# Patient Record
Sex: MALE | Race: WHITE | Employment: FULL TIME | URBAN - METROPOLITAN AREA
[De-identification: names, ages, dates, MRNs, and addresses within clinical notes are randomized per-mention and may not be internally consistent; named-entity substitution may affect disease eponyms.]

---

## 2020-03-03 ENCOUNTER — APPOINTMENT (OUTPATIENT)
Dept: CT IMAGING | Age: 55
End: 2020-03-03
Payer: COMMERCIAL

## 2020-03-03 ENCOUNTER — HOSPITAL ENCOUNTER (EMERGENCY)
Age: 55
Discharge: HOME | End: 2020-03-03
Attending: EMERGENCY MEDICINE
Payer: COMMERCIAL

## 2020-03-03 VITALS
HEART RATE: 66 BPM | TEMPERATURE: 98.9 F | DIASTOLIC BLOOD PRESSURE: 78 MMHG | WEIGHT: 220 LBS | BODY MASS INDEX: 32.58 KG/M2 | SYSTOLIC BLOOD PRESSURE: 128 MMHG | OXYGEN SATURATION: 94 % | RESPIRATION RATE: 16 BRPM | HEIGHT: 69 IN

## 2020-03-03 PROCEDURE — 74176 CT ABD & PELVIS W/O CONTRAST: CPT

## 2020-03-03 PROCEDURE — 99283 EMERGENCY DEPT VISIT LOW MDM: CPT

## 2020-03-03 ASSESSMENT — PAIN DESCRIPTION - ORIENTATION: ORIENTATION: LEFT

## 2020-03-03 ASSESSMENT — PAIN SCALES - GENERAL: PAINLEVEL_OUTOF10: 1

## 2020-03-03 ASSESSMENT — PAIN DESCRIPTION - PAIN TYPE: TYPE: ACUTE PAIN

## 2020-03-03 ASSESSMENT — PAIN DESCRIPTION - DESCRIPTORS: DESCRIPTORS: DISCOMFORT

## 2020-03-03 ASSESSMENT — PAIN DESCRIPTION - LOCATION: LOCATION: GROIN

## 2020-03-03 NOTE — ED PROVIDER NOTES
HPI:  3/3/20, Time: 8:42 AM         Dawn Figueroa is a 49 y.o. male presenting to the ED for left gring/inguinal pian with bulging, beginning 2-3 weeks ago. The complaint has been intermittent, moderate in severity, and worsened by heaving lifting. 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 Denies definitive sudden onset/popping sensation. Denies scrotal swelling or  complaints. Denies direct trauma. Patient denies fever/chills, cough, congestion, chest pain, shortness of breath, edema, headache, visual disturbances, focal paresthesias, focal weakness, abdominal pain, nausea, vomiting, diarrhea, constipation, dysuria, hematuria, trauma, neck or back pain or other complaints. ROS:   Pertinent positives and negatives are stated within HPI, all other systems reviewed and are negative.      --------------------------------------------- PAST HISTORY ---------------------------------------------  Past Medical History:  has a past medical history of Anxiety and OCD (obsessive compulsive disorder). Past Surgical History:  has a past surgical history that includes shoulder surgery. Social History:  reports that he has never smoked. He has never used smokeless tobacco. He reports that he does not drink alcohol or use drugs. Family History: family history is not on file. The patients home medications have been reviewed. Allergies: Patient has no known allergies.         ---------------------------------------------------PHYSICAL EXAM--------------------------------------    Constitutional:  Well developed, well nourished, no acute distress, non-toxic appearance   Eyes:  PERRL, conjunctiva normal, EOMI  HENT:  Atraumatic, external ears normal, nose normal, oropharynx moist. Neck- normal range of motion,   Respiratory:  No respiratory distress, normal breath sounds, no rales, no wheezing   Cardiovascular:  Normal rate, normal rhythm, no surgeons. Advised to contact his HR department today for follow-up. Patient was explicitly instructed on specific signs and symptoms on which to return to the emergency room for. Patient was instructed to return to the ER for any new or worsening symptoms. Additional discharge instructions were given verbally. All questions were answered. Patient is comfortable and agreeable with discharge plan. Patient in no acute distress and non-toxic in appearance. This patient's ED course included: a personal history and physicial eaxmination    This patient has remained hemodynamically stable during their ED course. Counseling: The emergency provider has spoken with the patient and discussed todays results, in addition to providing specific details for the plan of care and counseling regarding the diagnosis and prognosis. Questions are answered at this time and they are agreeable with the plan.       --------------------------------- IMPRESSION AND DISPOSITION ---------------------------------    IMPRESSION  1.  Left inguinal hernia        DISPOSITION  Disposition: Discharge to home  Patient condition is stable                  Perfecto Hopper DO  03/03/20 6985

## 2020-03-03 NOTE — ED NOTES
Discharge instructions given, patient verbalized their understanding, no other noted or stated problems at this time. Patient will follow up with primary doctor for care.      Nba Fisher RN  03/03/20 9812

## 2022-07-25 ENCOUNTER — HOSPITAL ENCOUNTER (EMERGENCY)
Age: 57
Discharge: HOME | End: 2022-07-25
Payer: COMMERCIAL

## 2022-07-25 ENCOUNTER — APPOINTMENT (OUTPATIENT)
Dept: GENERAL RADIOLOGY | Age: 57
End: 2022-07-25
Payer: COMMERCIAL

## 2022-07-25 VITALS
WEIGHT: 220 LBS | HEART RATE: 82 BPM | DIASTOLIC BLOOD PRESSURE: 88 MMHG | RESPIRATION RATE: 16 BRPM | SYSTOLIC BLOOD PRESSURE: 144 MMHG | HEIGHT: 69 IN | TEMPERATURE: 98.3 F | OXYGEN SATURATION: 98 % | BODY MASS INDEX: 32.58 KG/M2

## 2022-07-25 DIAGNOSIS — S60.211A CONTUSION OF RIGHT WRIST, INITIAL ENCOUNTER: ICD-10-CM

## 2022-07-25 DIAGNOSIS — S63.501A SPRAIN OF RIGHT WRIST, INITIAL ENCOUNTER: Primary | ICD-10-CM

## 2022-07-25 PROCEDURE — 99283 EMERGENCY DEPT VISIT LOW MDM: CPT

## 2022-07-25 PROCEDURE — 73110 X-RAY EXAM OF WRIST: CPT

## 2022-07-25 ASSESSMENT — PAIN - FUNCTIONAL ASSESSMENT: PAIN_FUNCTIONAL_ASSESSMENT: 0-10

## 2022-07-25 ASSESSMENT — PAIN SCALES - GENERAL: PAINLEVEL_OUTOF10: 10

## 2022-07-25 NOTE — ED PROVIDER NOTES
[facility]    Department of Emergency Medicine   ED  Provider Note  Admit Date/RoomTime: 7/25/2022  6:56 PM  ED Room: [place] [address]          6:52 PM EDT      HPI: Agustin Alvarez 55 y.o. male with a past medical history of   Past Medical History:   Diagnosis Date    Anxiety     OCD (obsessive compulsive disorder)      presents status post right wrist injury. The patient states that the injury occurred today. The history is obtained from the patient. Patient states that he caught his wrist between two objects while at work and this is a workers comp injury. Patient describes the pain as tender/sore. Patient states the pain is worsened by palpation or movement. Patient has limited range of motion and pain with flexion and extension of wrist joint. Patient denies any distal neurologic symptoms including numbness, tingling, paralysis or coolness of the extremity. No other reported injuries or other extremity tenderness or injuries. Patient denies any history of injury or surgery to this extremity. Patient has tried tylenol for symptom relief, which has helped minimally. Patient denies any head injury or back injury. He has no other reported concerns today. Review of Systems:   Pertinent positives and negatives are stated within HPI, all other systems reviewed and are negative. Prachi Golden --------------------------------------------- PAST HISTORY ---------------------------------------------  Past Medical History:  has a past medical history of Anxiety and OCD (obsessive compulsive disorder). Past Surgical History:  has a past surgical history that includes shoulder surgery. Social History:  reports that he has never smoked. He has never used smokeless tobacco. He reports that he does not drink alcohol and does not use drugs. Family History: family history is not on file. The patients home medications have been reviewed. Allergies: Patient has no known allergies.     Physical reviewed all laboratory and imaging results for this patient. Results are listed below. LABS:  No results found for this visit on 07/25/22. RADIOLOGY:  Interpreted by Radiologist.  XR WRIST RIGHT (MIN 3 VIEWS)   Final Result   1. No acute osseous findings seen about the right wrist.  Soft tissue   swelling about the right wrist likely related to recent injury. 2.  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 RECOMMENDATION:   In the setting of trauma, if there is persistent symptoms and physical exam   warrants a repeat radiograph in 10-14 days could be considered as occult   fractures may not be evident on initial imaging evaluation.                 ------------------------------ ED COURSE/MEDICAL DECISION MAKING----------------------  Medications - No data to display        Medical decision making: Patient presents to the emergency department today for evaluation of right wrist following an injury that occurred prior to arrival.  Patient had imaging completed today in emergency department which is negative for any acute findings. Patient given a brace which was applied to the extremity while in the emergency department. Patient was instructed to restrict activity, with goal to reduce swelling and pain. Patient educated to rest the extremity and utilize elevation and compression which will help reduce soft tissue swelling. Patient educated to apply ice for approximately 20-minute periods every 60 to 90 minutes for initial 24 to 48 hours following this injury. After swelling and pain are reduced, patient may begin rehabilitation exercises to prevent stiffness, improve ROM, and restore tissue/joint flexibility and strength. Patient can proceed with activity as tolerated. Patient can utilize Tylenol or anti-inflammatory medication for pain relief if needed. Patient will follow up with occupational health on an as needed basis.   Patient is to return to emergency department if any new symptoms such as swelling, numbness, or discoloration develops.              Impression:   Right wrist sprain  Right wrist contusion    Disposition:  Discharge    Condition:  BRYAN Anderson - TOM  07/25/22 0155

## 2024-06-19 ENCOUNTER — HOSPITAL ENCOUNTER (EMERGENCY)
Age: 59
Discharge: HOME | End: 2024-06-19
Payer: COMMERCIAL

## 2024-06-19 ENCOUNTER — APPOINTMENT (OUTPATIENT)
Dept: GENERAL RADIOLOGY | Age: 59
End: 2024-06-19
Payer: COMMERCIAL

## 2024-06-19 VITALS
HEART RATE: 71 BPM | TEMPERATURE: 98.8 F | BODY MASS INDEX: 32.49 KG/M2 | RESPIRATION RATE: 18 BRPM | OXYGEN SATURATION: 98 % | SYSTOLIC BLOOD PRESSURE: 145 MMHG | DIASTOLIC BLOOD PRESSURE: 99 MMHG | WEIGHT: 220 LBS

## 2024-06-19 DIAGNOSIS — S83.92XA SPRAIN OF LEFT KNEE, UNSPECIFIED LIGAMENT, INITIAL ENCOUNTER: Primary | ICD-10-CM

## 2024-06-19 PROCEDURE — 6370000000 HC RX 637 (ALT 250 FOR IP): Performed by: NURSE PRACTITIONER

## 2024-06-19 PROCEDURE — 99212 OFFICE O/P EST SF 10 MIN: CPT

## 2024-06-19 PROCEDURE — 73562 X-RAY EXAM OF KNEE 3: CPT

## 2024-06-19 RX ORDER — NAPROXEN 500 MG/1
500 TABLET ORAL 2 TIMES DAILY
Qty: 14 TABLET | Refills: 0 | Status: SHIPPED | OUTPATIENT
Start: 2024-06-19 | End: 2024-06-26

## 2024-06-19 RX ORDER — NAPROXEN 250 MG/1
500 TABLET ORAL ONCE
Status: COMPLETED | OUTPATIENT
Start: 2024-06-19 | End: 2024-06-19

## 2024-06-19 RX ADMIN — NAPROXEN 500 MG: 250 TABLET ORAL at 11:41

## 2024-06-19 ASSESSMENT — PAIN - FUNCTIONAL ASSESSMENT: PAIN_FUNCTIONAL_ASSESSMENT: 0-10

## 2024-06-19 ASSESSMENT — PAIN DESCRIPTION - LOCATION: LOCATION: KNEE

## 2024-06-19 ASSESSMENT — PAIN DESCRIPTION - FREQUENCY: FREQUENCY: INTERMITTENT

## 2024-06-19 ASSESSMENT — PAIN SCALES - GENERAL
PAINLEVEL_OUTOF10: 1
PAINLEVEL_OUTOF10: 2

## 2024-06-19 ASSESSMENT — PAIN DESCRIPTION - DESCRIPTORS: DESCRIPTORS: ACHING;SORE;DISCOMFORT

## 2024-06-19 ASSESSMENT — PAIN DESCRIPTION - PAIN TYPE: TYPE: ACUTE PAIN

## 2024-06-19 ASSESSMENT — PAIN DESCRIPTION - ORIENTATION: ORIENTATION: LEFT

## 2024-06-19 NOTE — ED PROVIDER NOTES
observed/palpated.              ROM: full range with pain.             Skin:  no wounds or erythema.       Drawer's:  Positive.       Lachman's: Negative.       Apley's: Negative.       Freddy's: Negative.       Valgus/Varus Stress: Negative.       Crepitus: Negative.       Joint(s) Above/Below: ankle.                Tenderness:  none            Swelling: No.              Deformity: no deformity observed/palpated.             ROM: full range of motion.            Skin:  no wounds, erythema, or swelling.       Neurovascular:             Motor deficit: none.              Sensory deficit: none.             Pulse deficit: none.             Capillary refill: normal.  Gait:  normal.  Lymphatics: No lymphangitis or adenopathy noted.  Neurological:  Oriented.  Motor functions intact.    Lab / Imaging Results   (All laboratory and radiology results have been personally reviewed by myself)  Labs:  No results found for this visit on 06/19/24.  Imaging:  All Radiology results interpreted by Radiologist unless otherwise noted.  XR KNEE LEFT (3 VIEWS)   Final Result   No acute abnormality of the knee.      Mild joint effusion.      Mild osteoarthritis.           ED Course / Medical Decision Making     Medications   naproxen (NAPROSYN) tablet 500 mg (500 mg Oral Given 6/19/24 1141)        Consult(s):   None    Procedure(s):   none.    MDM: Patient is well-appearing, afebrile presents to  today with left knee pain after stepping backwards while pulling an object at work.  X-rays were obtained negative for any acute bony abnormalities.  Advised on x-ray findings symptom control with rest, ice, compression and NSAIDs.  Patient given naproxen while here, applied Ace wrap for compression, advised to follow-up outpatient with corporate care or orthopedics if symptoms persist and was educated on signs and symptoms which require emergent re-evaluation, verbalized understanding and agreeable with plan of care.  Patient requesting to

## 2024-06-19 NOTE — ED NOTES
UDS and Saliva Alcohol specimens collected per protocol.     Kayla Chinchilla, WHITNEY  06/19/24 1156    
Present, unchanged

## 2024-07-02 ENCOUNTER — TELEPHONE (OUTPATIENT)
Dept: PHYSICAL THERAPY | Age: 59
End: 2024-07-02

## 2024-07-10 ENCOUNTER — EVALUATION (OUTPATIENT)
Dept: PHYSICAL THERAPY | Age: 59
End: 2024-07-10

## 2024-07-10 DIAGNOSIS — S83.92XA SPRAIN OF LEFT KNEE, UNSPECIFIED LIGAMENT, INITIAL ENCOUNTER: Primary | ICD-10-CM

## 2024-07-10 NOTE — PROGRESS NOTES
[facility] OUTPATIENT REHABILITATION  PHYSICAL THERAPY INITIAL EVALUATION         Date:  7/10/2024   Patient: Richard Traore  : 1965  MRN: [id number]  Referring Provider: Wilfrido Zurita PA-C [address] [place] [address]     Medical Diagnosis:      Diagnosis Orders   1. Sprain of left knee, unspecified ligament, initial encounter            Physician Order: Eval and Treat   Claim # [id number]  Dx: S83.92XA   approved 12 visits through 2024      SUBJECTIVE:     Onset date: 2024    Onset: Sudden     History / Mechanism of Injury: Pulling steel from press.  Felt sharp medial knee pain.      Interventions for current problem:  naproxen     Chief complaint:  pain with loading over time      Behavior: condition is getting better    Pain:  Current: 0/10     Best: 0/10     Worst:7/10     Symptom Type / Quality: sharp  Location:: Knee:  colleen-medial    Aggravated by:  prolonged standing, pivoting    Relieved by: reduced weightbearing    Imaging results: XR KNEE LEFT (3 VIEWS)    Result Date: 2024  EXAMINATION: THREE XRAY VIEWS OF THE LEFT KNEE 2024 10:39 am COMPARISON: None. HISTORY: ORDERING SYSTEM PROVIDED HISTORY: fall TECHNOLOGIST PROVIDED HISTORY: Reason for exam:->fall FINDINGS: No evidence of acute fracture or dislocation.  No focal osseous lesion. There is mild joint effusion. No focal soft tissue abnormality.     No acute abnormality of the knee. Mild joint effusion. Mild osteoarthritis.       Past Medical History  Past Medical History:   Diagnosis Date    Anxiety     OCD (obsessive compulsive disorder)      Past Surgical History:   Procedure Laterality Date    SHOULDER SURGERY      open reduction fx repair       Medications:   Current Outpatient Medications   Medication Sig Dispense Refill    naproxen (NAPROSYN) 500 MG tablet Take 1 tablet by mouth 2 times daily for 7 days 14 tablet 0    citalopram (CELEXA) 20 MG tablet Take 40 mg by mouth daily.

## 2024-07-10 NOTE — PROGRESS NOTES
Physical Therapy Daily Treatment Note    Date: 7/10/2024  Patient Name: Richard Traore  : 1965   MRN: [id number]  DOInjury: 2024   DOSx: --  Referring Provider: Wilfrido Zurita PA-C  [address] [place] [address]     Medical Diagnosis:      Diagnosis Orders   1. Sprain of left knee, unspecified ligament, initial encounter          Jos has superior colleen-medial knee pain with good motion and strength with no edema. Treatment will focus on building loading tolerance over time to improve ability to work without pain.     Access Code: [email or web address]  URL: [email or web address]  Date: 07/10/2024  Prepared by: Kendall Schultz    Exercises  - Supine Heel Slide (Mirrored)  - 2 x daily - 3 sets - 10 reps  - Supine Quad Set  - 2 x daily - 3 sets - 10-15 reps - 5 sec hold  - Supine Straight Leg Raises  - 2 x daily - 3 sets - 10 reps  - Seated Knee Extension AROM (Mirrored)  - 2 x daily - 3 sets - 10 reps    X = TO BE PERFORMED NEXT VISIT  > = PROGRESS TO THIS    S: See diazal  O:   Time 1700-5436     Visit     Claim # [id number]  Dx: S83.92XA   approved 12 visits through 2024  Repeat outcome measure at mid point and end.    Pain Pain 0-7/10     ROM Good     Modalities         MO   Exercise      NuStep       Heel slides 3 x 10  TE   QS 3 x 10  TE   SLR 3 x 10  TE   LAQ 3 x 10  TE   Leg Press 2-leg X  TE   Leg Press 1-leg   TE   Hamstring Curl Machine X  TE   Knee Extension Machine X  TE   Step-ups - FWD   TA   Step-ups - LAT   TA   Step-ups - BWD   TA   Calf Raises   TA      TA      TA               A:  Tolerated well.  Discussed anatomy, physiology, body mechanics, principles of loading, and progressive loading/activity.  Reviewed home exercise program extensively; written copy provided.    P: Continue with rehab plan  Kendall Schultz PT    Treatment Charges: Mins Units   Initial Evaluation 20 1   Re-Evaluation     Ther Exercise         TE 25 2   Manual Therapy     MT     Ther Activities        TA

## 2024-07-15 ENCOUNTER — TREATMENT (OUTPATIENT)
Dept: PHYSICAL THERAPY | Age: 59
End: 2024-07-15
Payer: COMMERCIAL

## 2024-07-15 DIAGNOSIS — S83.92XA SPRAIN OF LEFT KNEE, UNSPECIFIED LIGAMENT, INITIAL ENCOUNTER: Primary | ICD-10-CM

## 2024-07-15 PROCEDURE — 97110 THERAPEUTIC EXERCISES: CPT

## 2024-07-15 NOTE — PROGRESS NOTES
Physical Therapy Daily Treatment Note    Date: 7/15/2024  Patient Name: Richard Traore  : 1965   MRN: [id number]  DOInjury: 2024   DOSx: --  Referring Provider: Wilfrido Zurita PA-C  [address] [place], [place] [id number]     Medical Diagnosis:      Diagnosis Orders   1. Sprain of left knee, unspecified ligament, initial encounter          Jos has superior colleen-medial knee pain with good motion and strength with no edema. Treatment will focus on building loading tolerance over time to improve ability to work without pain.     Access Code: [email or web address]  URL: [email or web address]  Date: 07/10/2024  Prepared by: Kendall Schultz    Exercises  - Supine Heel Slide (Mirrored)  - 2 x daily - 3 sets - 10 reps  - Supine Quad Set  - 2 x daily - 3 sets - 10-15 reps - 5 sec hold  - Supine Straight Leg Raises  - 2 x daily - 3 sets - 10 reps  - Seated Knee Extension AROM (Mirrored)  - 2 x daily - 3 sets - 10 reps    X = TO BE PERFORMED NEXT VISIT  > = PROGRESS TO THIS    S: pt reports no new changes since last visit .  O:   Time 1701-0512     Visit     Claim # [id number]  Dx: S83.92XA   approved 12 visits through 2024  Repeat outcome measure at mid point and end.    Pain Pain 0-7/10     ROM Good     Modalities         MO   Exercise      NuStep  L 5 x 5 min      Heel slides HEP  TE   QS  TE   SLR  TE   LAQ  TE   Leg Press 2-leg 25# 3 X 15 new Pt given handouts of ex's 7/15/2024  TE   Leg Press 1-leg   TE   Hamstring Curl Machine 35# 3 X 15 new   TE   Knee Extension Machine 4# cuff wt  3 X 15   TE   Step-ups - FWD   TA   Step-ups - LAT   TA   Step-ups - BWD   TA   Calf Raises   TA      TA      TA               A:  Tolerated well. Pt will perform previously given ex's home later tonight , new introduced wt machine ex's performed this session. Discussed anatomy, physiology, body mechanics, principles of loading, and progressive loading/activity.  Reviewed home exercise program extensively; written copy

## 2024-07-16 ENCOUNTER — HOSPITAL ENCOUNTER (OUTPATIENT)
Dept: MRI IMAGING | Age: 59
Discharge: HOME | End: 2024-07-18
Payer: COMMERCIAL

## 2024-07-16 DIAGNOSIS — S83.92XA SPRAIN OF LEFT KNEE, INITIAL ENCOUNTER: ICD-10-CM

## 2024-07-16 PROCEDURE — 73721 MRI JNT OF LWR EXTRE W/O DYE: CPT

## 2024-07-17 ENCOUNTER — TREATMENT (OUTPATIENT)
Dept: PHYSICAL THERAPY | Age: 59
End: 2024-07-17
Payer: COMMERCIAL

## 2024-07-17 DIAGNOSIS — S83.92XA SPRAIN OF LEFT KNEE, UNSPECIFIED LIGAMENT, INITIAL ENCOUNTER: Primary | ICD-10-CM

## 2024-07-17 PROCEDURE — 97110 THERAPEUTIC EXERCISES: CPT | Performed by: PHYSICAL THERAPIST

## 2024-07-17 NOTE — PROGRESS NOTES
Physical Therapy Daily Treatment Note    Date: 2024  Patient Name: Richard Traore  : 1965   MRN: [id number]  DOInjury: 2024   DOSx: --  Referring Provider: Wilfrido Zurita PA-C  [address] [place] [address]     Medical Diagnosis:      Diagnosis Orders   1. Sprain of left knee, unspecified ligament, initial encounter          Jos has superior colleen-medial knee pain with good motion and strength with no edema. Treatment will focus on building loading tolerance over time to improve ability to work without pain.     Access Code: [email or web address]  URL: [email or web address]  Date: 07/10/2024  Prepared by: Kendall Schultz    Exercises  - Supine Heel Slide (Mirrored)  - 2 x daily - 3 sets - 10 reps  - Supine Quad Set  - 2 x daily - 3 sets - 10-15 reps - 5 sec hold  - Supine Straight Leg Raises  - 2 x daily - 3 sets - 10 reps  - Seated Knee Extension AROM (Mirrored)  - 2 x daily - 3 sets - 10 reps    X = TO BE PERFORMED NEXT VISIT  > = PROGRESS TO THIS    S: Feeling better.  Had MRI today.  PCP referring Jos to Dr. Zimmerman for consult.   O:   Time 5648-9187     Visit 3/12    Claim # [id number]  Dx: S83.92XA   approved 12 visits through 2024  Repeat outcome measure at mid point and end.    Pain Pain 0-7/10     ROM Good     Modalities         MO   Exercise      NuStep  L 5 x 10 min      Heel slides HEP  TE   QS  TE   SLR  TE   LAQ  TE   Leg Press 2-leg 60 lbs 3 x 12 Pt given handouts of ex's 7/15/2024  TE   Leg Press 1-leg >  TE   Hamstring Curl Machine 35 lbs 3 X 12  TE   Knee Extension Machine 20 lbs 3 x 12   TE   Step-ups - FWD >  TA   Step-ups - LAT >  TA   Step-ups - BWD >  TA   Calf Raises >  TA      TA      TA               A:  Tolerated well.   P: Continue with rehab plan  Kendall Schultz, PT    Treatment Charges: Mins Units   Initial Evaluation     Re-Evaluation     Ther Exercise         TE 45 3   Manual Therapy     MT     Ther Activities        TA     Gait Training          GT     Neuro

## 2024-07-29 ENCOUNTER — TREATMENT (OUTPATIENT)
Dept: PHYSICAL THERAPY | Age: 59
End: 2024-07-29
Payer: COMMERCIAL

## 2024-07-29 DIAGNOSIS — S83.92XA SPRAIN OF LEFT KNEE, UNSPECIFIED LIGAMENT, INITIAL ENCOUNTER: Primary | ICD-10-CM

## 2024-07-29 PROCEDURE — 97110 THERAPEUTIC EXERCISES: CPT

## 2024-07-29 NOTE — PROGRESS NOTES
Physical Therapy Daily Treatment Note    Date: 2024  Patient Name: Richard Traore  : 1965   MRN: [id number]  DOInjury: 2024   DOSx: --  Referring Provider: Wilfrido Zurita PA-C  [address] [phone]     Medical Diagnosis:      Diagnosis Orders   1. Sprain of left knee, unspecified ligament, initial encounter          Jos has superior colleen-medial knee pain with good motion and strength with no edema. Treatment will focus on building loading tolerance over time to improve ability to work without pain.     Access Code: [email or web address]  URL: [email or web address]  Date: 07/10/2024  Prepared by: Kendall Schultz    Exercises  - Supine Heel Slide (Mirrored)  - 2 x daily - 3 sets - 10 reps  - Supine Quad Set  - 2 x daily - 3 sets - 10-15 reps - 5 sec hold  - Supine Straight Leg Raises  - 2 x daily - 3 sets - 10 reps  - Seated Knee Extension AROM (Mirrored)  - 2 x daily - 3 sets - 10 reps    X = TO BE PERFORMED NEXT VISIT  > = PROGRESS TO THIS    S: Feeling pretty good today . O:   Time 8450-4824     Visit     Claim # [id number]  Dx: S83.92XA   approved 12 visits through 2024  Repeat outcome measure at mid point and end.    Pain Pain 0-7/10     ROM Good     Modalities         MO   Exercise      NuStep  L 5 x 10 min      Heel slides HEP  TE   QS  TE   SLR  TE   LAQ  TE   Leg Press 2-leg 60 lbs 3 x 12 Pt given handouts of ex's 7/15/2024  TE   Leg Press 1-leg >  TE   Hamstring Curl Machine 35 lbs 3 X 12  TE   Knee Extension Machine 20 lbs 3 x 12   TE   Step-ups - FWD >  TA   Step-ups - LAT >  TA   Step-ups - BWD >  TA   Calf Raises >  TA      TA      TA               A:  Tolerated well.   P: Continue with rehab plan  Jp Perdomo PTA    Treatment Charges: Mins Units   Initial Evaluation     Re-Evaluation     Ther Exercise         TE 30 2   Manual Therapy     MT     Ther Activities        TA     Gait Training          GT     Neuro Re-education NR     Modalities     Non-Billable Service

## 2024-07-31 ENCOUNTER — HOSPITAL ENCOUNTER (EMERGENCY)
Age: 59
Discharge: HOME | End: 2024-07-31
Attending: STUDENT IN AN ORGANIZED HEALTH CARE EDUCATION/TRAINING PROGRAM
Payer: COMMERCIAL

## 2024-07-31 ENCOUNTER — APPOINTMENT (OUTPATIENT)
Dept: GENERAL RADIOLOGY | Age: 59
End: 2024-07-31
Payer: COMMERCIAL

## 2024-07-31 VITALS
OXYGEN SATURATION: 94 % | HEIGHT: 69 IN | TEMPERATURE: 97.7 F | SYSTOLIC BLOOD PRESSURE: 139 MMHG | BODY MASS INDEX: 32.58 KG/M2 | WEIGHT: 220 LBS | RESPIRATION RATE: 16 BRPM | DIASTOLIC BLOOD PRESSURE: 94 MMHG | HEART RATE: 66 BPM

## 2024-07-31 DIAGNOSIS — T14.8XXA SKIN ABRASION: ICD-10-CM

## 2024-07-31 DIAGNOSIS — S82.832A CLOSED FRACTURE OF DISTAL END OF LEFT FIBULA, UNSPECIFIED FRACTURE MORPHOLOGY, INITIAL ENCOUNTER: Primary | ICD-10-CM

## 2024-07-31 PROCEDURE — 90471 IMMUNIZATION ADMIN: CPT | Performed by: STUDENT IN AN ORGANIZED HEALTH CARE EDUCATION/TRAINING PROGRAM

## 2024-07-31 PROCEDURE — 99284 EMERGENCY DEPT VISIT MOD MDM: CPT

## 2024-07-31 PROCEDURE — 90714 TD VACC NO PRESV 7 YRS+ IM: CPT | Performed by: STUDENT IN AN ORGANIZED HEALTH CARE EDUCATION/TRAINING PROGRAM

## 2024-07-31 PROCEDURE — 6360000002 HC RX W HCPCS: Performed by: STUDENT IN AN ORGANIZED HEALTH CARE EDUCATION/TRAINING PROGRAM

## 2024-07-31 PROCEDURE — 6370000000 HC RX 637 (ALT 250 FOR IP): Performed by: STUDENT IN AN ORGANIZED HEALTH CARE EDUCATION/TRAINING PROGRAM

## 2024-07-31 PROCEDURE — 73610 X-RAY EXAM OF ANKLE: CPT

## 2024-07-31 RX ORDER — BACITRACIN ZINC 500 [USP'U]/G
OINTMENT TOPICAL ONCE
Status: COMPLETED | OUTPATIENT
Start: 2024-07-31 | End: 2024-07-31

## 2024-07-31 RX ORDER — BACITRACIN ZINC AND POLYMYXIN B SULFATE 500; 1000 [USP'U]/G; [USP'U]/G
OINTMENT TOPICAL
Qty: 15 G | Refills: 1 | Status: SHIPPED | OUTPATIENT
Start: 2024-07-31 | End: 2024-08-07

## 2024-07-31 RX ORDER — OXYCODONE HYDROCHLORIDE AND ACETAMINOPHEN 5; 325 MG/1; MG/1
1 TABLET ORAL EVERY 8 HOURS PRN
Qty: 10 TABLET | Refills: 0 | Status: SHIPPED | OUTPATIENT
Start: 2024-07-31 | End: 2024-08-03

## 2024-07-31 RX ADMIN — CLOSTRIDIUM TETANI TOXOID ANTIGEN (FORMALDEHYDE INACTIVATED) AND CORYNEBACTERIUM DIPHTHERIAE TOXOID ANTIGEN (FORMALDEHYDE INACTIVATED) 0.5 ML: 5; 2 INJECTION, SUSPENSION INTRAMUSCULAR at 15:01

## 2024-07-31 RX ADMIN — BACITRACIN ZINC: 500 OINTMENT TOPICAL at 15:32

## 2024-07-31 NOTE — DISCHARGE INSTRUCTIONS
Please return to the ER for any new or worsening symptomss  If prescribed, please be sure to  your prescriptions from the pharmacy  Please follow-up with Orthopedic surgery and occupational health  as instructed  You are to remain non-weight bearing on your left leg/ankle until cleared by orthopedic surgery

## 2024-07-31 NOTE — ED PROVIDER NOTES
[facility] EMERGENCY DEPARTMENT  EMERGENCY DEPARTMENT ENCOUNTER        Pt Name: Richard Traore  MRN: [id number]  Birthdate 1965  Date of evaluation: 7/31/2024  Provider: Alba Damian DO  PCP: Johnathon Clancy MD  Note Started: 5:09 PM EDT 7/31/24    CHIEF COMPLAINT       Chief Complaint   Patient presents with    OTHER     Several pieces of steel fell on pt, abrasions bilateral legs and left side of face denies loc ambulatory       HISTORY OF PRESENT ILLNESS: 1 or more Elements     Limitations to history : None    Richard Traore is a 58-year-old male who presents to the ED for evaluation after he was injured at work.  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  He says he does not really have pain anywhere, but does have some swelling to the left ankle laterally.  He is not on blood thinner medications.  Did not fall to the ground.  He denies any jaw pain or difficulty with chewing or speaking.  Denies any dental pain or oral injury.  No numbness or weakness anywhere.  Does have some abrasions to his bilateral anterior shins but denies any shin pain.  No other complaints at this time.  Unsure of his last tetanus shot.      Nursing Notes were all reviewed and agreed with or any disagreements were addressed in the HPI.      REVIEW OF EXTERNAL NOTE :       Reviewed documentation from visit with physical therapy on 7/10/2024.    Chart Review/External Note Review    Last Echo reviewed by Me:  No results found for: \"LVEF\", \"LVEFMODE\"        Controlled Substance Monitoring:    Acute and Chronic Pain Monitoring:        No data to display                      REVIEW OF SYSTEMS :      Review of Systems   Musculoskeletal:  Positive for joint swelling (left ankle). Negative for arthralgias, back pain and neck pain.  MDM.    Interpretation per the Radiologist below, if available at the time of this note:    XR ANKLE LEFT (MIN 3 VIEWS)   Final Result   Near anatomic alignment of comminuted intra-articular distal fibular   fracture, post closed reduction.         XR ANKLE LEFT (MIN 3 VIEWS)   Final Result   1. Acute oblique minimally comminuted infra syndesmotic fracture of the   distal left fibula with moderate lateral soft tissue swelling.           XR ANKLE LEFT (MIN 3 VIEWS)    Result Date: 7/31/2024  EXAMINATION: THREE XRAY VIEWS OF THE LEFT ANKLE 7/31/2024 2:06 pm COMPARISON: None. HISTORY: ORDERING SYSTEM PROVIDED HISTORY: injury, swelling laterally TECHNOLOGIST PROVIDED HISTORY: Reason for exam:->injury, swelling laterally FINDINGS: There is an acute oblique minimally comminuted infra syndesmotic fracture of the distal left fibula with moderate lateral soft tissue swelling.  Ankle mortise appears intact.  Proximal 5th metatarsal is suboptimally visualized but appears intact.  No subcutaneous or soft tissue gas.     1. Acute oblique minimally comminuted infra syndesmotic fracture of the distal left fibula with moderate lateral soft tissue swelling.       No results found.    PROCEDURES   Unless otherwise noted below, none      PAST MEDICAL HISTORY/Chronic Conditions Affecting Care      has a past medical history of Anxiety and OCD (obsessive compulsive disorder).     EMERGENCY DEPARTMENT COURSE    Vitals:    Vitals:    07/31/24 1237 07/31/24 1500 07/31/24 1649   BP:  (!) 139/94    Pulse:  66    Resp:  16    Temp:  97.7 °F (36.5 °C)    TempSrc:  Oral    SpO2:  94%    Weight: 99.8 kg (220 lb)     Height:   1.753 m (5' 9\")       Patient was given the following medications:  Medications   bacitracin zinc ointment ( Topical Given 7/31/24 1532)   tetanus & diphtheria toxoids (adult) 5-2 LFU injection 0.5 mL (0.5 mLs IntraMUSCular Given 7/31/24 1501)           Is this patient to be included in the SEP-1 Core Measure due to severe

## 2024-07-31 NOTE — CONSULTS
Department of Orthopedic Surgery  Resident Consult Note    Reason for Consult: Left ankle fracture    HISTORY OF PRESENT ILLNESS:       Patient is a 58 y.o. male who presents with left ankle pain.  Patient reports he was at work today when steel fell on him causing left ankle pain, multiple skin abrasions about anterior lower legs.  Reports he was supposed to follow-up with Dr. Zimmerman regarding left knee MRI, has not had this visit yet.  Community ambulator without assist.  No other orthopedic complaints.      Past Medical History:        Diagnosis Date    Anxiety     OCD (obsessive compulsive disorder)      Past Surgical History:        Procedure Laterality Date    SHOULDER SURGERY      open reduction fx repair     Current Medications:   Current Facility-Administered Medications: bacitracin zinc ointment, , Topical, Once  Allergies:  Patient has no known allergies.    Social History:   TOBACCO:   reports that he has never smoked. He has never used smokeless tobacco.  ETOH:   reports no history of alcohol use.  DRUGS:   reports no history of drug use.  ACTIVITIES OF DAILY LIVING:    OCCUPATION:   Family History:   History reviewed. No pertinent family history.    REVIEW OF SYSTEMS:  CONSTITUTIONAL:  negative for  fevers, chills  EYES:  negative for blurred vision, visual disturbance  HEENT:  negative for  hearing loss, voice change  RESPIRATORY:  negative for  dyspnea, wheezing  CARDIOVASCULAR:  negative for  chest pain, palpitations  GASTROINTESTINAL:  negative for nausea, vomiting  GENITOURINARY:  negative for frequency, urinary incontinence  HEMATOLOGIC/LYMPHATIC:  negative for bleeding and petechiae  MUSCULOSKELETAL:  positive for  pain, joint swelling, and decreased range of motion  NEUROLOGICAL:  negative for headaches, dizziness  BEHAVIOR/PSYCH:  negative for increased agitation and anxiety    PHYSICAL EXAM:    VITALS:  BP (!) 139/94   Pulse 66   Wt 99.8 kg (220 lb)   SpO2 94%   BMI 32.49 kg/m²    CONSTITUTIONAL:  awake, alert, cooperative, no apparent distress, and appears stated age  HENT: Head: Atraumatic.   Eyes: EOMI  Neck: Trachea midline  Cardiovascular: Brisk capillary refill to all extremities, extremities well perfused  Pulmonary/Chest: No increased work of breathing, no cough  Abdominal: Non-distended  Neurologic:  Awake, alert and oriented in three planes. No gross deficits   MUSCULOSKELETAL:  Left lower Extremity:  Skin intact circumferentially, mild superficial abrasion about anterior lower leg  Tenderness about lateral malleolus, tenderness about medial aspect ankle  Lower leg compartments soft compressible  Demonstrates active plantarflexion, dorsiflexion, great toe extension  Capillary refill less than 2 seconds, foot warm well-perfused  Distal sensation grossly intact superficial and deep peroneal, tibial, sural, saphenous nerves  Remainder of extremity atraumatic    Secondary Exam:   bilateralUE: No obvious signs of trauma.  -TTP to fingers, hand, wrist, forearm, elbow, humerus, shoulder or clavicle.-- Patient able to flex/extend fingers, wrist, elbow and shoulder with active and passive ROM without pain, +2/4 Radial pulse, compartments soft and compressible.    rightLE: No obvious signs of trauma.   -TTP to foot, ankle, leg, knee, thigh, hip.-- Patient able to flex/extend toes, ankle, knee and hip with active and passive ROM without pain,+2/4 DP & PT pulses, compartments soft and compressible.    Pelvis: -TTP, -Log roll, -Heel strike     DATA:    CBC: No results found for: \"WBC\", \"RBC\", \"HGB\", \"HCT\", \"MCV\", \"MCH\", \"MCHC\", \"RDW\", \"PLT\", \"MPV\"  PT/INR:  No results found for: \"PROTIME\", \"INR\"    Radiology Review:  X-ray-multiple views left ankle demonstrating lateral malleolus fracture about [place] syndesmotic region, no displacement noted about talus    X-ray-multiple views stress demonstrating minimal to no increase medial clear space    IMPRESSION:  Closed left Arizmendi B lateral malleolus

## 2024-08-01 ENCOUNTER — TELEPHONE (OUTPATIENT)
Dept: ORTHOPEDIC SURGERY | Age: 59
End: 2024-08-01

## 2024-08-01 NOTE — TELEPHONE ENCOUNTER
Pt called back. I gave him the information to go to Occupational [facility]. Patient is aware and voiced understanding.

## 2024-08-01 NOTE — TELEPHONE ENCOUNTER
Pt called in to [facility] an appt for a fx of left fibula. He is [facility] for a new pt appt on 8/26/24 for a different issue. The appt for the fx fibula is WC. Please, advise. Richard can be reached at [phone]

## 2024-08-02 ENCOUNTER — TELEPHONE (OUTPATIENT)
Dept: ORTHOPEDIC SURGERY | Age: 59
End: 2024-08-02

## 2024-08-02 ASSESSMENT — ENCOUNTER SYMPTOMS
BACK PAIN: 0
COLOR CHANGE: 0

## 2024-08-02 NOTE — TELEPHONE ENCOUNTER
Pt was seen at [facility] 08/02 today and he is calling to schedule a New Workers Comp claim -follow up for Fracture of left ankle . Please contact pt.

## 2024-08-05 NOTE — TELEPHONE ENCOUNTER
Pt called in to check on the status of getting an appt, advised pt the office is waiting on the c-9 and we will contact him scheduling.

## 2024-08-12 ENCOUNTER — OFFICE VISIT (OUTPATIENT)
Dept: ORTHOPEDIC SURGERY | Age: 59
End: 2024-08-12
Payer: COMMERCIAL

## 2024-08-12 VITALS — BODY MASS INDEX: 32.58 KG/M2 | WEIGHT: 220 LBS | HEIGHT: 69 IN

## 2024-08-12 DIAGNOSIS — S82.832A OTHER CLOSED FRACTURE OF DISTAL END OF LEFT FIBULA, INITIAL ENCOUNTER: Primary | ICD-10-CM

## 2024-08-12 PROCEDURE — 99203 OFFICE O/P NEW LOW 30 MIN: CPT | Performed by: ORTHOPAEDIC SURGERY

## 2024-08-12 NOTE — PROGRESS NOTES
Chief Complaint   Patient presents with    Ankle Injury     [facility] left ankle fracture after steel fell on patient. DOI 7/31/24. Patient presents in splint and NWB with use of crutches.        Richard Traore is a 58 y.o.  male who presents today with a left ankle injury.  The injury occurred on 7/31.  The history of injury was from steel that fell on his foot.  The patient complains of pain over lateral ankle.  The intensity is 2/10.  The pain is described as: sharp.  Previous treatment includes: splint, crutches, OTC medications. He has been non-weight bearing. He works at [employer].    Past Medical History:   Diagnosis Date    Anxiety     OCD (obsessive compulsive disorder)      Past Surgical History:   Procedure Laterality Date    SHOULDER SURGERY      open reduction fx repair       Current Outpatient Medications:     citalopram (CELEXA) 20 MG tablet, Take 2 tablets by mouth daily, Disp: , Rfl:     naproxen (NAPROSYN) 500 MG tablet, Take 1 tablet by mouth 2 times daily for 7 days, Disp: 14 tablet, Rfl: 0  No Known Allergies  Social History     Socioeconomic History    Marital status: Single     Spouse name: Not on file    Number of children: Not on file    Years of education: Not on file    Highest education level: Not on file   Occupational History    Not on file   Tobacco Use    Smoking status: Never    Smokeless tobacco: Never   Substance and Sexual Activity    Alcohol use: No    Drug use: No    Sexual activity: Not on file   Other Topics Concern    Not on file   Social History Narrative    Not on file     Social Determinants of Health     Financial Resource Strain: Not on file   Food Insecurity: Not on file   Transportation Needs: Not on file   Physical Activity: Not on file   Stress: Not on file   Social Connections: Not on file   Intimate Partner Violence: Not on file   Housing Stability: Not on file     No family history on file.    REVIEW OF SYSTEMS:     General/Constitution:  (-)weight

## 2024-08-15 ENCOUNTER — OFFICE VISIT (OUTPATIENT)
Dept: ORTHOPEDIC SURGERY | Age: 59
End: 2024-08-15

## 2024-08-15 ENCOUNTER — TELEPHONE (OUTPATIENT)
Dept: ORTHOPEDIC SURGERY | Age: 59
End: 2024-08-15

## 2024-08-15 VITALS — WEIGHT: 220 LBS | HEIGHT: 69 IN | BODY MASS INDEX: 32.58 KG/M2

## 2024-08-15 DIAGNOSIS — S82.832A OTHER CLOSED FRACTURE OF DISTAL END OF LEFT FIBULA, INITIAL ENCOUNTER: Primary | ICD-10-CM

## 2024-08-15 NOTE — PROGRESS NOTES
Chief Complaint   Patient presents with    Ankle Pain     [facility]. Left ankle CT f/u. Patient remains NWB in splint and using crutches.        Richard Traore is a 58 y.o.  male who presents today for a follow up of his workers comp injury.  I ordered a CT scan and he is here to discuss the results.      Past Medical History:   Diagnosis Date    Anxiety     OCD (obsessive compulsive disorder)      Past Surgical History:   Procedure Laterality Date    SHOULDER SURGERY      open reduction fx repair       Current Outpatient Medications:     citalopram (CELEXA) 20 MG tablet, Take 2 tablets by mouth daily, Disp: , Rfl:     naproxen (NAPROSYN) 500 MG tablet, Take 1 tablet by mouth 2 times daily for 7 days, Disp: 14 tablet, Rfl: 0  No Known Allergies  Social History     Socioeconomic History    Marital status: Single     Spouse name: Not on file    Number of children: Not on file    Years of education: Not on file    Highest education level: Not on file   Occupational History    Not on file   Tobacco Use    Smoking status: Never    Smokeless tobacco: Never   Substance and Sexual Activity    Alcohol use: No    Drug use: No    Sexual activity: Not on file   Other Topics Concern    Not on file   Social History Narrative    Not on file     Social Determinants of Health     Financial Resource Strain: Not on file   Food Insecurity: Not on file   Transportation Needs: Not on file   Physical Activity: Not on file   Stress: Not on file   Social Connections: Not on file   Intimate Partner Violence: Not on file   Housing Stability: Not on file     No family history on file.    REVIEW OF SYSTEMS:     General/Constitution:  (-)weight loss, (-)fever, (-)chills, (-)weakness.   Skin: (-) rash,(-) psoriasis,(-) eczema, (-)skin cancer.   Musculoskeletal: (-) fractures,  (-) dislocations,(-) collagen vascular disease, (-) fibromyalgia, (-) multiple sclerosis, (-) muscular dystrophy, (-) RSD,(-) joint pain (-)swelling, (-) joint

## 2024-08-15 NOTE — TELEPHONE ENCOUNTER
Pt called in wanting to know if Dr. Zimmerman would be able to give him a script for a handicap placard? Please, advise. Richard can be reached at [phone]

## 2024-08-26 ENCOUNTER — OFFICE VISIT (OUTPATIENT)
Dept: ORTHOPEDIC SURGERY | Age: 59
End: 2024-08-26
Payer: COMMERCIAL

## 2024-08-26 VITALS — WEIGHT: 220 LBS | HEIGHT: 69 IN | TEMPERATURE: 98.6 F | BODY MASS INDEX: 32.58 KG/M2

## 2024-08-26 DIAGNOSIS — S83.92XA SPRAIN OF LEFT KNEE, UNSPECIFIED LIGAMENT, INITIAL ENCOUNTER: Primary | ICD-10-CM

## 2024-08-26 PROCEDURE — 99213 OFFICE O/P EST LOW 20 MIN: CPT | Performed by: ORTHOPAEDIC SURGERY

## 2024-09-06 DIAGNOSIS — S82.832A OTHER CLOSED FRACTURE OF DISTAL END OF LEFT FIBULA, INITIAL ENCOUNTER: Primary | ICD-10-CM

## 2024-09-13 ENCOUNTER — OFFICE VISIT (OUTPATIENT)
Dept: ORTHOPEDIC SURGERY | Age: 59
End: 2024-09-13

## 2024-09-13 VITALS — HEIGHT: 69 IN | BODY MASS INDEX: 32.58 KG/M2 | WEIGHT: 220 LBS

## 2024-09-13 DIAGNOSIS — S82.832A OTHER CLOSED FRACTURE OF DISTAL END OF LEFT FIBULA, INITIAL ENCOUNTER: Primary | ICD-10-CM

## 2024-09-20 ENCOUNTER — EVALUATION (OUTPATIENT)
Dept: PHYSICAL THERAPY | Age: 59
End: 2024-09-20
Payer: COMMERCIAL

## 2024-09-20 DIAGNOSIS — S82.832A OTHER CLOSED FRACTURE OF DISTAL END OF LEFT FIBULA, INITIAL ENCOUNTER: Primary | ICD-10-CM

## 2024-09-20 PROCEDURE — 97110 THERAPEUTIC EXERCISES: CPT | Performed by: PHYSICAL THERAPIST

## 2024-09-20 PROCEDURE — 97162 PT EVAL MOD COMPLEX 30 MIN: CPT | Performed by: PHYSICAL THERAPIST

## 2024-09-23 ENCOUNTER — TREATMENT (OUTPATIENT)
Dept: PHYSICAL THERAPY | Age: 59
End: 2024-09-23
Payer: COMMERCIAL

## 2024-09-23 DIAGNOSIS — S82.832A OTHER CLOSED FRACTURE OF DISTAL END OF LEFT FIBULA, INITIAL ENCOUNTER: Primary | ICD-10-CM

## 2024-09-23 PROCEDURE — 97530 THERAPEUTIC ACTIVITIES: CPT | Performed by: PHYSICAL THERAPIST

## 2024-09-25 ENCOUNTER — TREATMENT (OUTPATIENT)
Dept: PHYSICAL THERAPY | Age: 59
End: 2024-09-25
Payer: COMMERCIAL

## 2024-09-25 DIAGNOSIS — S82.832A OTHER CLOSED FRACTURE OF DISTAL END OF LEFT FIBULA, INITIAL ENCOUNTER: Primary | ICD-10-CM

## 2024-09-25 PROCEDURE — 97530 THERAPEUTIC ACTIVITIES: CPT

## 2024-09-25 PROCEDURE — 97110 THERAPEUTIC EXERCISES: CPT

## 2024-09-27 ENCOUNTER — TREATMENT (OUTPATIENT)
Dept: PHYSICAL THERAPY | Age: 59
End: 2024-09-27

## 2024-09-27 DIAGNOSIS — S82.832A OTHER CLOSED FRACTURE OF DISTAL END OF LEFT FIBULA, INITIAL ENCOUNTER: Primary | ICD-10-CM

## 2024-09-30 ENCOUNTER — TREATMENT (OUTPATIENT)
Dept: PHYSICAL THERAPY | Age: 59
End: 2024-09-30
Payer: COMMERCIAL

## 2024-09-30 DIAGNOSIS — S82.832A OTHER CLOSED FRACTURE OF DISTAL END OF LEFT FIBULA, INITIAL ENCOUNTER: Primary | ICD-10-CM

## 2024-09-30 PROCEDURE — 97110 THERAPEUTIC EXERCISES: CPT

## 2024-09-30 PROCEDURE — 97530 THERAPEUTIC ACTIVITIES: CPT

## 2024-09-30 NOTE — PROGRESS NOTES
Physical Therapy Daily Treatment Note    Date: 2024  Patient Name: Richard Traore  : 1965   MRN: [id number]  DOInjury: 2024   DOSx: -  Referring Provider: Robbin Zimmerman DO   [place] [phone]     Medical Diagnosis:      Diagnosis Orders   1. Other closed fracture of distal end of left fibula, initial encounter              X = TO BE PERFORMED NEXT VISIT  > = PROGRESS TO THIS    S: pt reports feeling pretty good.   O: Discussed anatomy, physiology, body mechanics, principles of loading, and progressive loading/activity.  Reviewed home exercise program extensively; written copy provided.     Access Code: [email or web address]  URL: [email or web address]  Date: 2024  Prepared by: Zak Mckeon    Exercises  - Standing Marching  - 2 x daily - 7 x weekly - 3 sets - 30 reps  - Standing Heel Raise  - 2 x daily - 7 x weekly - 3 sets - 15 reps  - Step Up  - 2 x daily - 7 x weekly - 3 sets - 10 reps    Time 9385-5201 am     Visit  visits  Repeat outcome measure at mid point and end.    Pain 0/10     ROM Slight limitation in plantarflexion and dorsiflexion     Modalities         MO   Manual            Stretch      Towel / strap DF, INV, EV   TE      TE   nustep 10 min     Ankle pumps HEP TE   Ankle circles  CW/CCW HEP    Heel slides   TE   QS   TE   SLR   TE   SAQ   TE   [x] TG  [] Leg Press 2-leg  80# 3 x 15   TE   [] TG  [] Leg Press 1-leg   TE   Hamstring Curl Machine   TE   Knee Extension Machine 3# 3 x 15 new   TE   Step-ups - FWD 7\" 3 x 10  TA   Step-ups - LAT   TA   Step-ups - BWD   TA   marching 3 x 15 superset TA   Calf Raises 3 x 15 superset TA      TA               A:  Tolerated well.  Pt performing all ex's with good control .   P: Continue with rehab plan  Jp Perdomo PTA    Treatment Charges: Mins Units   Initial Evaluation     Re-Evaluation     Ther Exercise         TE 15 1   Manual Therapy     MT     Ther Activities        TA 30 2   Gait Training          GT

## 2024-10-02 ENCOUNTER — TREATMENT (OUTPATIENT)
Dept: PHYSICAL THERAPY | Age: 59
End: 2024-10-02
Payer: COMMERCIAL

## 2024-10-02 DIAGNOSIS — S82.832A OTHER CLOSED FRACTURE OF DISTAL END OF LEFT FIBULA, INITIAL ENCOUNTER: Primary | ICD-10-CM

## 2024-10-02 DIAGNOSIS — S83.92XA SPRAIN OF LEFT KNEE, UNSPECIFIED LIGAMENT, INITIAL ENCOUNTER: ICD-10-CM

## 2024-10-02 PROCEDURE — 97110 THERAPEUTIC EXERCISES: CPT

## 2024-10-02 PROCEDURE — 97530 THERAPEUTIC ACTIVITIES: CPT

## 2024-10-02 NOTE — PROGRESS NOTES
Physical Therapy Daily Treatment Note    Date: 10/2/2024  Patient Name: Richard Traore  : 1965   MRN: [id number]  DOInjury: 2024   DOSx: -  Referring Provider: [place] [address]     Medical Diagnosis:      Diagnosis Orders   1. Other closed fracture of distal end of left fibula, initial encounter        2. Sprain of left knee, unspecified ligament, initial encounter              X = TO BE PERFORMED NEXT VISIT  > = PROGRESS TO THIS    S: pt reports feeling pretty good.   O: Discussed anatomy, physiology, body mechanics, principles of loading, and progressive loading/activity.  Reviewed home exercise program extensively; written copy provided.     Access Code: [email or web address]  URL: [email or web address]  Date: 2024  Prepared by: Zak Mckeon    Exercises  - Standing Marching  - 2 x daily - 7 x weekly - 3 sets - 30 reps  - Standing Heel Raise  - 2 x daily - 7 x weekly - 3 sets - 15 reps  - Step Up  - 2 x daily - 7 x weekly - 3 sets - 10 reps    Time 8967-1101 am     Visit  visits  Repeat outcome measure at mid point and end.    Pain 0/10     ROM Slight limitation in plantarflexion and dorsiflexion     Modalities         MO   Manual            Stretch      Towel / strap DF, INV, EV   TE      TE   nustep 10 min     Ankle pumps HEP TE   Ankle circles  CW/CCW HEP    Heel slides   TE   QS   TE   SLR   TE   SAQ   TE   [x] TG  [] Leg Press 2-leg  80# 3 x 15   TE   [] TG  [] Leg Press 1-leg   TE   Hamstring Curl Machine   TE   Knee Extension Machine 3# 3 x 15   TE   Step-ups - FWD 2# 7\" 3 x 10  TA   Step-ups - LAT   TA   Step-ups - BWD   TA   marching 2# 3 x 15 superset TA   Calf Raises 2# 3 x 15 superset TA      TA               A:  Tolerated well.  Addition of 2# wt's as listed.  P: Continue with rehab plan  Jp Perdomo PTA    Treatment Charges: Mins Units   Initial Evaluation     Re-Evaluation     Ther Exercise         TE 15 1   Manual Therapy     MT

## 2024-10-04 ENCOUNTER — TREATMENT (OUTPATIENT)
Dept: PHYSICAL THERAPY | Age: 59
End: 2024-10-04

## 2024-10-04 DIAGNOSIS — S82.832A OTHER CLOSED FRACTURE OF DISTAL END OF LEFT FIBULA, INITIAL ENCOUNTER: Primary | ICD-10-CM

## 2024-10-04 NOTE — PROGRESS NOTES
Physical Therapy Daily Treatment Note    Date: 10/4/2024  Patient Name: Richard Traore  : 1965   MRN: [id number]  DOInjury: 2024   DOSx: -  Referring Provider: Robbin Zimmerman DO   [place] [phone]     Medical Diagnosis:      Diagnosis Orders   1. Other closed fracture of distal end of left fibula, initial encounter          X = TO BE PERFORMED NEXT VISIT  > = PROGRESS TO THIS    S: pt reports feeling pretty good.   O:     Access Code: [email or web address]  URL: [email or web address]  Date: 2024  Prepared by: Zak Mckeon    Exercises  - Standing Marching  - 2 x daily - 7 x weekly - 3 sets - 30 reps  - Standing Heel Raise  - 2 x daily - 7 x weekly - 3 sets - 15 reps  - Step Up  - 2 x daily - 7 x weekly - 3 sets - 10 reps    Time 7650-1948     Visit  visits    Claim # [id number]  Dx: [id number]  DOI: 2024  Wc approved 18 visits through 10/31/2024  Repeat outcome measure at mid point and end.    Pain 0/10     ROM Slight limitation in plantarflexion and dorsiflexion     Modalities         MO   Manual            Stretch      Towel / strap DF, INV, EV   TE      TE   nustep 10 min     Ankle pumps HEP TE   Ankle circles  CW/CCW HEP    Heel slides   TE   QS   TE   SLR   TE   SAQ   TE   [x] TG  [] Leg Press 2-leg  80# 3 x 15   TE   [] TG  [] Leg Press 1-leg   TE   Hamstring Curl Machine   TE   Knee Extension Machine 4# 3 x 15   TE   Step-ups - FWD 2# 7\" 3 x 10  TA   Step-ups - LAT   TA   Step-ups - BWD   TA   marching 2# 3 x 15 superset TA   Calf Raises 2# 3 x 15 superset TA      TA               A:  Tolerated well.   Discussed anatomy, physiology, body mechanics, principles of loading, and progressive loading/activity.   P: Continue with rehab plan  Aimee Ziegler PTA    Treatment Charges: Mins Units   Initial Evaluation     Re-Evaluation     Ther Exercise         TE 15 1   Manual Therapy     MT     Ther Activities        TA 30 2   Gait Training          GT     Neuro

## 2024-10-07 ENCOUNTER — TREATMENT (OUTPATIENT)
Dept: PHYSICAL THERAPY | Age: 59
End: 2024-10-07
Payer: COMMERCIAL

## 2024-10-07 DIAGNOSIS — S82.832A OTHER CLOSED FRACTURE OF DISTAL END OF LEFT FIBULA, INITIAL ENCOUNTER: Primary | ICD-10-CM

## 2024-10-07 PROCEDURE — 97530 THERAPEUTIC ACTIVITIES: CPT

## 2024-10-07 PROCEDURE — 97110 THERAPEUTIC EXERCISES: CPT

## 2024-10-07 NOTE — PROGRESS NOTES
Therapy     MT     Ther Activities        TA 30 2   Gait Training          GT     Neuro Re-education NR     Modalities     Non-Billable Service Time     Other     Total Time/Units 40 3

## 2024-10-09 ENCOUNTER — TREATMENT (OUTPATIENT)
Dept: PHYSICAL THERAPY | Age: 59
End: 2024-10-09
Payer: COMMERCIAL

## 2024-10-09 DIAGNOSIS — S82.832A OTHER CLOSED FRACTURE OF DISTAL END OF LEFT FIBULA, INITIAL ENCOUNTER: Primary | ICD-10-CM

## 2024-10-09 PROCEDURE — 97110 THERAPEUTIC EXERCISES: CPT

## 2024-10-09 PROCEDURE — 97530 THERAPEUTIC ACTIVITIES: CPT

## 2024-10-09 NOTE — PROGRESS NOTES
Physical Therapy Daily Treatment Note    Date: 10/9/2024  Patient Name: Richard Traore  : 1965   MRN: [id number]  DOInjury: 2024   DOSx: -  Referring Provider: [place] [address]     Medical Diagnosis:      Diagnosis Orders   1. Other closed fracture of distal end of left fibula, initial encounter        2. Sprain of left knee, unspecified ligament, initial encounter          X = TO BE PERFORMED NEXT VISIT  > = PROGRESS TO THIS    S: pt reports feeling pretty good.   O:     Access Code: [email or web address]  URL: [email or web address]  Date: 2024  Prepared by: Zak Mckeon    Exercises  - Standing Marching  - 2 x daily - 7 x weekly - 3 sets - 30 reps  - Standing Heel Raise  - 2 x daily - 7 x weekly - 3 sets - 15 reps  - Step Up  - 2 x daily - 7 x weekly - 3 sets - 10 reps    Time 3628-2908 am     Visit  visits    Claim # [id number]  Dx: [id number]  DOI: 2024  Wc approved 18 visits through 10/31/2024  Repeat outcome measure at mid point and end.    Pain 0/10     ROM Slight limitation in plantarflexion and dorsiflexion     Modalities         MO   Manual            Stretch      Towel / strap DF, INV, EV   TE      TE   nustep L 6 x 10 min no arms      Ankle pumps HEP TE   Ankle circles  CW/CCW HEP    Heel slides   TE   QS   TE   SLR   TE   SAQ   TE   [x] TG  [] Leg Press 2-leg  80# 3 x 15   TE   [] TG  [] Leg Press 1-leg   TE   Hamstring Curl Machine   TE   Knee Extension Machine 4# 3 x 15   TE   Step-ups - FWD 2# 7\" 3 x 10 Superset  TA   Step-ups - LAT   TA   Step-ups - BWD   TA   marching 2# 3 x 15 superset TA   Calf Raises 2# 3 x 15 superset TA      TA               A:  Tolerated well. Pt able to perform all ex's with good form , no increase in c/o.    Discussed anatomy, physiology, body mechanics, principles of loading, and progressive loading/activity.   P: Continue with rehab plan  Jp Perdomo PTA    Treatment Charges: Mins Units   Initial

## 2024-10-11 ENCOUNTER — TREATMENT (OUTPATIENT)
Dept: PHYSICAL THERAPY | Age: 59
End: 2024-10-11

## 2024-10-11 DIAGNOSIS — S82.832A OTHER CLOSED FRACTURE OF DISTAL END OF LEFT FIBULA, INITIAL ENCOUNTER: Primary | ICD-10-CM

## 2024-10-11 DIAGNOSIS — S83.92XA SPRAIN OF LEFT KNEE, UNSPECIFIED LIGAMENT, INITIAL ENCOUNTER: ICD-10-CM

## 2024-10-11 NOTE — PROGRESS NOTES
Evaluation     Re-Evaluation     Ther Exercise         TE 10 1   Manual Therapy     MT     Ther Activities        TA 30 2   Gait Training          GT     Neuro Re-education NR     Modalities     Non-Billable Service Time     Other     Total Time/Units 40 3

## 2024-10-14 ENCOUNTER — TREATMENT (OUTPATIENT)
Dept: PHYSICAL THERAPY | Age: 59
End: 2024-10-14
Payer: COMMERCIAL

## 2024-10-14 DIAGNOSIS — S82.832A OTHER CLOSED FRACTURE OF DISTAL END OF LEFT FIBULA, INITIAL ENCOUNTER: Primary | ICD-10-CM

## 2024-10-14 PROCEDURE — 97110 THERAPEUTIC EXERCISES: CPT

## 2024-10-14 PROCEDURE — 97530 THERAPEUTIC ACTIVITIES: CPT

## 2024-10-14 NOTE — PROGRESS NOTES
Physical Therapy Daily Treatment Note    Date: 10/14/2024  Patient Name: Richard Traore  : 1965   MRN: [id number]  DOInjury: 2024   DOSx: -  Referring Provider: Robbin Zimmerman DO   [place] [phone]     Medical Diagnosis:      Diagnosis Orders   1. Other closed fracture of distal end of left fibula, initial encounter          X = TO BE PERFORMED NEXT VISIT  > = PROGRESS TO THIS    S: pt reports feeling pretty good.   O:     Access Code: [email or web address]  URL: [email or web address]  Date: 2024  Prepared by: Zak Mckeon    Exercises  - Standing Marching  - 2 x daily - 7 x weekly - 3 sets - 30 reps  - Standing Heel Raise  - 2 x daily - 7 x weekly - 3 sets - 15 reps  - Step Up  - 2 x daily - 7 x weekly - 3 sets - 10 reps    Time 2750-4377  am     Visit  visits    Claim # [id number]  Dx: [id number]  DOI: 2024  Wc approved 18 visits through 10/31/2024  Repeat outcome measure at mid point and end.    Pain 0/10     ROM Slight limitation in plantarflexion and dorsiflexion     Modalities         MO   Manual            Stretch      Towel / strap DF, INV, EV   TE      TE   nustep L 6 x 10 min no arms      Ankle pumps HEP TE   Ankle circles  CW/CCW HEP    Heel slides   TE   QS   TE   SLR   TE   SAQ   TE   [x] TG  [] Leg Press 2-leg  80# 3 x 15   TE   [] TG  [] Leg Press 1-leg   TE   Hamstring Curl Machine 3 x 15 new   TE   Knee Extension Machine 4# 3 x 15   TE   Step-ups - FWD 2# 7\" 3 x 10 Superset  TA   Step-ups - LAT   TA   Step-ups - BWD   TA   marching 2# 3 x 15 superset TA   Calf Raises 2# 3 x 15 superset TA      TA               A:  Tolerated well. Pt able to perform all ex's with good form , no increase in c/o.    Discussed anatomy, physiology, body mechanics, principles of loading, and progressive loading/activity.   P: Continue with rehab plan  Jp Perdomo PTA    Treatment Charges: Mins Units   Initial Evaluation     Re-Evaluation     Ther Exercise         TE 10 1

## 2024-10-16 ENCOUNTER — TREATMENT (OUTPATIENT)
Dept: PHYSICAL THERAPY | Age: 59
End: 2024-10-16
Payer: COMMERCIAL

## 2024-10-16 DIAGNOSIS — S82.832A OTHER CLOSED FRACTURE OF DISTAL END OF LEFT FIBULA, INITIAL ENCOUNTER: Primary | ICD-10-CM

## 2024-10-16 PROCEDURE — 97530 THERAPEUTIC ACTIVITIES: CPT

## 2024-10-16 PROCEDURE — 97110 THERAPEUTIC EXERCISES: CPT

## 2024-10-16 NOTE — PROGRESS NOTES
Physical Therapy Daily Treatment Note    Date: 10/16/2024  Patient Name: Richard Traore  : 1965   MRN: [id number]  DOInjury: 2024   DOSx: -  Referring Provider: Robbin Zimmerman DO   [place] [phone]     Medical Diagnosis:      Diagnosis Orders   1. Other closed fracture of distal end of left fibula, initial encounter          X = TO BE PERFORMED NEXT VISIT  > = PROGRESS TO THIS    S: pt reports feeling pretty good.   O:     Access Code: [email or web address]  URL: [email or web address]  Date: 2024  Prepared by: Zak Mckeon    Exercises  - Standing Marching  - 2 x daily - 7 x weekly - 3 sets - 30 reps  - Standing Heel Raise  - 2 x daily - 7 x weekly - 3 sets - 15 reps  - Step Up  - 2 x daily - 7 x weekly - 3 sets - 10 reps    Time 2926-4747  am     Visit  visits    Claim # [id number]  Dx: [id number]  DOI: 2024  Wc approved 18 visits through 10/31/2024  Repeat outcome measure at mid point and end.    Pain 0/10     ROM Slight limitation in plantarflexion and dorsiflexion     Modalities         MO   Manual            Stretch      Towel / strap DF, INV, EV   TE      TE   nustep L 6 x 10 min no arms      Ankle pumps HEP TE   Ankle circles  CW/CCW HEP    Heel slides   TE   QS   TE   SLR   TE   SAQ   TE   [x] TG  [] Leg Press 2-leg  80# 3 x 15   TE   [] TG  [] Leg Press 1-leg   TE   Hamstring Curl Machine 40# 3 x 15   TE   Knee Extension Machine 4# 3 x 15   TE   Step-ups - FWD  3# 7\" 3 x 10 Superset  TA   Step-ups - LAT   TA   Step-ups - BWD   TA   marching   3# 3 x 15 superset TA   Calf Raises   3# 3 x 15 superset TA      TA               A:  Tolerated well. Progression in cuff wt's 2# - 3# as listed .Pt able to perform all ex's with good form , no increase in c/o.    Discussed anatomy, physiology, body mechanics, principles of loading, and progressive loading/activity.   P: Continue with rehab plan  Jp Perdomo PTA    Treatment Charges: Mins Units   Initial Evaluation

## 2024-10-18 ENCOUNTER — TREATMENT (OUTPATIENT)
Dept: PHYSICAL THERAPY | Age: 59
End: 2024-10-18

## 2024-10-18 DIAGNOSIS — S82.832A OTHER CLOSED FRACTURE OF DISTAL END OF LEFT FIBULA, INITIAL ENCOUNTER: Primary | ICD-10-CM

## 2024-10-18 NOTE — PROGRESS NOTES
Physical Therapy Daily Treatment Note    Date: 10/18/2024  Patient Name: Richard Traore  : 1965   MRN: [id number]  DOInjury: 2024   DOSx: -  Referring Provider: Robbin Zimmerman DO   [place] [phone]     Medical Diagnosis:      Diagnosis Orders   1. Other closed fracture of distal end of left fibula, initial encounter          X = TO BE PERFORMED NEXT VISIT  > = PROGRESS TO THIS    S: pt reports feeling pretty good.   O:     Access Code: [email or web address]  URL: [email or web address]  Date: 2024  Prepared by: Zak Mckeon    Exercises  - Standing Marching  - 2 x daily - 7 x weekly - 3 sets - 30 reps  - Standing Heel Raise  - 2 x daily - 7 x weekly - 3 sets - 15 reps  - Step Up  - 2 x daily - 7 x weekly - 3 sets - 10 reps    Time 0170-1391  am     Visit  visits    Claim # [id number]  Dx: [id number]  DOI: 2024  Wc approved 18 visits through 10/31/2024  Repeat outcome measure at mid point and end.    Pain 0/10     ROM Slight limitation in plantarflexion and dorsiflexion     Modalities         MO   Manual            Stretch      Towel / strap DF, INV, EV   TE   Recumbent bike  X 10 min   TE   nustep          Ankle pumps HEP TE   Ankle circles  CW/CCW HEP    Heel slides   TE   QS   TE   SLR   TE   SAQ   TE   [x] TG  [] Leg Press 2-leg  80# 3 x 15   TE   [] TG  [] Leg Press 1-leg   TE   Hamstring Curl Machine 40# 3 x 15   TE   Knee Extension Machine 4# 3 x 15   TE   Step-ups - FWD  3# 7\" 3 x 10 Superset  TA   Step-ups - LAT   TA   Step-ups - BWD   TA   marching   3# 3 x 15 superset TA   Calf Raises   3# 3 x 15 superset TA      TA               A:  Tolerated well. Pt able to perform all ex's with good form , no increase in c/o.    Discussed anatomy, physiology, body mechanics, principles of loading, and progressive loading/activity.   P: Continue with rehab plan  Jp Perdomo PTA    Treatment Charges: Mins Units   Initial Evaluation     Re-Evaluation     Ther Exercise

## 2024-10-22 ENCOUNTER — OFFICE VISIT (OUTPATIENT)
Dept: ORTHOPEDIC SURGERY | Age: 59
End: 2024-10-22

## 2024-10-22 ENCOUNTER — TREATMENT (OUTPATIENT)
Dept: PHYSICAL THERAPY | Age: 59
End: 2024-10-22
Payer: COMMERCIAL

## 2024-10-22 VITALS — WEIGHT: 220 LBS | TEMPERATURE: 98 F | HEIGHT: 69 IN | BODY MASS INDEX: 32.58 KG/M2

## 2024-10-22 DIAGNOSIS — S82.832A OTHER CLOSED FRACTURE OF DISTAL END OF LEFT FIBULA, INITIAL ENCOUNTER: Primary | ICD-10-CM

## 2024-10-22 PROCEDURE — 97530 THERAPEUTIC ACTIVITIES: CPT

## 2024-10-22 PROCEDURE — 97110 THERAPEUTIC EXERCISES: CPT

## 2024-10-22 PROCEDURE — 99024 POSTOP FOLLOW-UP VISIT: CPT | Performed by: ORTHOPAEDIC SURGERY

## 2024-10-22 NOTE — PROGRESS NOTES
Richard Traore returns today for follow-up of a left distal fibula fracture.  Date of injury was 7/31/24.  He reports he is doing well and has minimal pain. He has been attending PT. He wears normal shoe and ambulates without aid. He does still get some swelling in the ankle.     Physical Exam:   - Skin intact with mild swelling           is non-tender to palpation at the area of the fracture site.          ROM  wnl          The skin overlying the distal fibula is intact without evidence of scar, lesion, laceration or abrasion.          Pulses are intact and symmetric bilaterally         Strength dec         Sensation wnl    Xrays:   Healed distal fibula fracture  Radiographic findings reviewed with patient    Impression:   Encounter Diagnosis   Name Primary?    Other closed fracture of distal end of left fibula, initial encounter Yes         Plan:   I will put in a C9 for an ASO for him to wear as needed.  He will finish his PT and will return to work on 11/4/2024.    I will see him back in 2 months.

## 2024-10-22 NOTE — PROGRESS NOTES
Physical Therapy Daily Treatment Note    Date: 10/22/2024  Patient Name: Richard Traore  : 1965   MRN: [id number]  DOInjury: 2024   DOSx: -  Referring Provider: Robbin Zimmerman DO   [place] [id number]     Medical Diagnosis:      Diagnosis Orders   1. Other closed fracture of distal end of left fibula, initial encounter          X = TO BE PERFORMED NEXT VISIT  > = PROGRESS TO THIS    S: pt reports feeling pretty good.   O:     Access Code: [email or web address]  URL: [email or web address]  Date: 2024  Prepared by: Zak Mckeon    Exercises  - Standing Marching  - 2 x daily - 7 x weekly - 3 sets - 30 reps  - Standing Heel Raise  - 2 x daily - 7 x weekly - 3 sets - 15 reps  - Step Up  - 2 x daily - 7 x weekly - 3 sets - 10 reps    Time 9537-6093  am     Visit  /18 visits    Claim # [id number]  Dx: [id number]  DOI: 2024  Wc approved 18 visits through 10/31/2024  Repeat outcome measure at mid point and end.    Pain 0/10     ROM Slight limitation in plantarflexion and dorsiflexion     Modalities         MO   Manual            Stretch      Towel / strap DF, INV, EV   TE   Recumbent bike  X 10 min   TE   nustep          Ankle pumps HEP TE   Ankle circles  CW/CCW HEP    Heel slides   TE   QS   TE   SLR   TE   SAQ   TE   [x] TG  [] Leg Press 2-leg  80# 3 x 15   TE   [] TG  [] Leg Press 1-leg   TE   Hamstring Curl Machine 40# 3 x 15   TE   Knee Extension Machine 4# 3 x 15   TE   Step-ups - FWD  3# 7\" 3 x 10 Superset  TA   Step-ups - LAT   TA   Step-ups - BWD   TA   marching   3# 3 x 15 superset TA   Calf Raises   3# 3 x 15 superset TA      TA               A:  Tolerated well. Pt able to perform all ex's with good form , no increase in c/o.    Discussed anatomy, physiology, body mechanics, principles of loading, and progressive loading/activity.   P: Continue with rehab plan  Jp Perdomo PTA    Treatment Charges: Mins Units   Initial Evaluation     Re-Evaluation     Ther Exercise       Star Wedge Flap Text: The defect edges were debeveled with a #15 scalpel blade. Given the location of the defect, shape of the defect and the proximity to free margins a star wedge flap was deemed most appropriate. Using a sterile surgical marker, an appropriate rotation flap was drawn incorporating the defect and placing the expected incisions within the relaxed skin tension lines where possible. The area thus outlined was incised deep to adipose tissue with a #15 scalpel blade. The skin margins were undermined to an appropriate distance in all directions utilizing iris scissors. Following this, the designed flap was carried over into the primary defect and sutured into place.

## 2024-10-23 ENCOUNTER — TREATMENT (OUTPATIENT)
Dept: PHYSICAL THERAPY | Age: 59
End: 2024-10-23
Payer: COMMERCIAL

## 2024-10-23 DIAGNOSIS — S82.832A OTHER CLOSED FRACTURE OF DISTAL END OF LEFT FIBULA, INITIAL ENCOUNTER: Primary | ICD-10-CM

## 2024-10-23 PROCEDURE — 97530 THERAPEUTIC ACTIVITIES: CPT

## 2024-10-23 PROCEDURE — 97110 THERAPEUTIC EXERCISES: CPT

## 2024-10-23 NOTE — PROGRESS NOTES
Physical Therapy Daily Treatment Note    Date: 10/23/2024  Patient Name: Richard Traore  : 1965   MRN: [id number]  DOInjury: 2024   DOSx: -  Referring Provider: Robbin Zimmerman DO   [place] [id number]     Medical Diagnosis:      Diagnosis Orders   1. Other closed fracture of distal end of left fibula, initial encounter          X = TO BE PERFORMED NEXT VISIT  > = PROGRESS TO THIS    S: pt reports feeling pretty good.   O:     Access Code: [email or web address]  URL: [email or web address]  Date: 2024  Prepared by: Zak Mckeon    Exercises  - Standing Marching  - 2 x daily - 7 x weekly - 3 sets - 30 reps  - Standing Heel Raise  - 2 x daily - 7 x weekly - 3 sets - 15 reps  - Step Up  - 2 x daily - 7 x weekly - 3 sets - 10 reps    Time 4349-8484  am     Visit 15 /18 visits    Claim # [id number]  Dx: [id number]  DOI: 2024  Wc approved 18 visits through 10/31/2024  Repeat outcome measure at mid point and end.    Pain 0/10     ROM Slight limitation in plantarflexion and dorsiflexion     Modalities         MO   Manual            Stretch      Towel / strap DF, INV, EV   TE   Recumbent bike  X 10 min   TE   nustep          Ankle pumps HEP TE   Ankle circles  CW/CCW HEP    Heel slides   TE   QS   TE   SLR   TE   SAQ   TE   [x] TG  [] Leg Press 2-leg  80# 3 x 15   TE   [] TG  [] Leg Press 1-leg   TE   Hamstring Curl Machine 40# 3 x 15   TE   Knee Extension Machine 4# 3 x 15   TE   Step-ups - FWD  3# 7\" 3 x 10 Superset  TA   Step-ups - LAT   TA   Step-ups - BWD   TA   marching   3# 3 x 15 superset TA   Calf Raises   3# 3 x 15 superset TA      TA               A:  Tolerated well. Pt able to perform all ex's with good form , no increase in c/o.    Discussed anatomy, physiology, body mechanics, principles of loading, and progressive loading/activity.   P: Continue with rehab plan  Jp Perdomo PTA    Treatment Charges: Mins Units   Initial Evaluation     Re-Evaluation     Ther Exercise

## 2024-10-25 ENCOUNTER — TREATMENT (OUTPATIENT)
Dept: PHYSICAL THERAPY | Age: 59
End: 2024-10-25

## 2024-10-25 DIAGNOSIS — S82.832A OTHER CLOSED FRACTURE OF DISTAL END OF LEFT FIBULA, INITIAL ENCOUNTER: Primary | ICD-10-CM

## 2024-10-28 ENCOUNTER — TREATMENT (OUTPATIENT)
Dept: PHYSICAL THERAPY | Age: 59
End: 2024-10-28
Payer: COMMERCIAL

## 2024-10-28 DIAGNOSIS — S82.832A OTHER CLOSED FRACTURE OF DISTAL END OF LEFT FIBULA, INITIAL ENCOUNTER: Primary | ICD-10-CM

## 2024-10-28 PROCEDURE — 97110 THERAPEUTIC EXERCISES: CPT

## 2024-10-28 PROCEDURE — 97530 THERAPEUTIC ACTIVITIES: CPT

## 2024-10-28 NOTE — PROGRESS NOTES
Physical Therapy Daily Treatment Note    Date: 10/28/2024  Patient Name: Richard Traore  : 1965   MRN: [id number]  DOInjury: 2024   DOSx: -  Referring Provider: Robbin Zimmerman DO   [place] [phone]     Medical Diagnosis:      Diagnosis Orders   1. Other closed fracture of distal end of left fibula, initial encounter          X = TO BE PERFORMED NEXT VISIT  > = PROGRESS TO THIS    S: pt reports feeling pretty good.   O:     Access Code: [email or web address]  URL: [email or web address]  Date: 2024  Prepared by: Zak Mckeon    Exercises  - Standing Marching  - 2 x daily - 7 x weekly - 3 sets - 30 reps  - Standing Heel Raise  - 2 x daily - 7 x weekly - 3 sets - 15 reps  - Step Up  - 2 x daily - 7 x weekly - 3 sets - 10 reps    Time 9579-4378 am     Visit 17 /18 visits    Claim # [id number]  Dx: [id number]  DOI: 2024  Wc approved 18 visits through 10/31/2024  Repeat outcome measure at mid point and end.    Pain 0/10     ROM Slight limitation in plantarflexion and dorsiflexion     Modalities         MO   Manual            Stretch      Towel / strap DF, INV, EV   TE   Recumbent bike   TE   nustep L 6 x 10 min no arms           Ankle pumps HEP TE   Ankle circles  CW/CCW HEP    Heel slides   TE   QS   TE   SLR   TE   SAQ   TE   [x] TG  [] Leg Press 2-leg  80# 3 x 15   TE   [] TG  [] Leg Press 1-leg   TE   Hamstring Curl Machine 40# 3 x 15   TE   Knee Extension Machine 4# 3 x 15   TE   Step-ups - FWD   3# 7\" 3 x 10 Superset  TA   Step-ups - LAT   TA   Step-ups - BWD   TA   marching   3# 3 x 15 superset TA   Calf Raises   3# 3 x 15 superset TA      TA               A:  Tolerated well. .  Pt able to perform all ex's with good form , no increase in c/o.    Discussed anatomy, physiology, body mechanics, principles of loading, and progressive loading/activity.   P: Continue with rehab plan  Jp Perdomo PTA    Treatment Charges: Mins Units   Initial Evaluation     Re-Evaluation

## 2024-10-30 ENCOUNTER — TREATMENT (OUTPATIENT)
Dept: PHYSICAL THERAPY | Age: 59
End: 2024-10-30

## 2024-10-30 DIAGNOSIS — S82.832A OTHER CLOSED FRACTURE OF DISTAL END OF LEFT FIBULA, INITIAL ENCOUNTER: Primary | ICD-10-CM

## 2024-12-11 DIAGNOSIS — S82.832A OTHER CLOSED FRACTURE OF DISTAL END OF LEFT FIBULA, INITIAL ENCOUNTER: Primary | ICD-10-CM

## 2024-12-23 ENCOUNTER — OFFICE VISIT (OUTPATIENT)
Dept: ORTHOPEDIC SURGERY | Age: 59
End: 2024-12-23

## 2024-12-23 VITALS — TEMPERATURE: 98 F | WEIGHT: 220 LBS | HEIGHT: 69 IN | OXYGEN SATURATION: 98 % | BODY MASS INDEX: 32.58 KG/M2

## 2024-12-23 DIAGNOSIS — S82.832A OTHER CLOSED FRACTURE OF DISTAL END OF LEFT FIBULA, INITIAL ENCOUNTER: Primary | ICD-10-CM

## 2024-12-23 NOTE — PROGRESS NOTES
Chief Complaint   Patient presents with    Follow-up     Patient is here for a follow up on his left distal fibula fracture. Patient is done with therapy and back to work. No issues or complaints.       Richard Traore returns today for follow-up of left ankle pain.  He reports that the pain is better. He finished PT and has no issues. He does not wear brace and is back to work.    Past Medical History:   Diagnosis Date    Anxiety     OCD (obsessive compulsive disorder)      Past Surgical History:   Procedure Laterality Date    SHOULDER SURGERY      open reduction fx repair       Current Outpatient Medications:     citalopram (CELEXA) 20 MG tablet, Take 2 tablets by mouth daily, Disp: , Rfl:   No Known Allergies  Social History     Socioeconomic History    Marital status: Single     Spouse name: Not on file    Number of children: Not on file    Years of education: Not on file    Highest education level: Not on file   Occupational History    Not on file   Tobacco Use    Smoking status: Never    Smokeless tobacco: Never   Substance and Sexual Activity    Alcohol use: No    Drug use: No    Sexual activity: Not on file   Other Topics Concern    Not on file   Social History Narrative    Not on file     Social Determinants of Health     Financial Resource Strain: Not on file   Food Insecurity: Not on file   Transportation Needs: Not on file   Physical Activity: Not on file   Stress: Not on file   Social Connections: Not on file   Intimate Partner Violence: Not on file   Housing Stability: Not on file     No family history on file.    Review of Systems:     Skin: (-) rash,(-) psoriasis,(-) eczema, (-)skin cancer.   Musculoskeletal: (-) fractures,  (-) dislocations,(-) collagen vascular disease, (-) fibromyalgia, (-) multiple sclerosis, (-) muscular dystrophy, (-) RSD,(-) joint pain (-)swelling, (-) joint pain,swelling.  Neurologic: (-) epilepsy, (-)seizures,(-) brain tumor,(-) TIA, (-)stroke, (-)headaches, (-)Parkinson

## 2025-03-14 DIAGNOSIS — S82.832A OTHER CLOSED FRACTURE OF DISTAL END OF LEFT FIBULA, INITIAL ENCOUNTER: Primary | ICD-10-CM

## 2025-03-24 ENCOUNTER — OFFICE VISIT (OUTPATIENT)
Dept: ORTHOPEDIC SURGERY | Age: 60
End: 2025-03-24
Payer: COMMERCIAL

## 2025-03-24 VITALS
OXYGEN SATURATION: 99 % | BODY MASS INDEX: 32.58 KG/M2 | HEIGHT: 69 IN | RESPIRATION RATE: 18 BRPM | TEMPERATURE: 97.5 F | WEIGHT: 220 LBS

## 2025-03-24 DIAGNOSIS — S82.832A OTHER CLOSED FRACTURE OF DISTAL END OF LEFT FIBULA, INITIAL ENCOUNTER: Primary | ICD-10-CM

## 2025-03-24 PROCEDURE — 99213 OFFICE O/P EST LOW 20 MIN: CPT | Performed by: ORTHOPAEDIC SURGERY

## 2025-03-24 NOTE — PROGRESS NOTES
Chief Complaint   Patient presents with    Follow-up     Patient presents for a follow up on his left ankle. Patient states doing well. No issues or complaints.       Richard Traore returns today for follow-up of left ankle fx.  He is doing well and not having any issues.    Past Medical History:   Diagnosis Date    Anxiety     OCD (obsessive compulsive disorder)      Past Surgical History:   Procedure Laterality Date    SHOULDER SURGERY      open reduction fx repair       Current Outpatient Medications:     citalopram (CELEXA) 20 MG tablet, Take 2 tablets by mouth daily, Disp: , Rfl:   No Known Allergies  Social History     Socioeconomic History    Marital status: Single     Spouse name: Not on file    Number of children: Not on file    Years of education: Not on file    Highest education level: Not on file   Occupational History    Not on file   Tobacco Use    Smoking status: Never    Smokeless tobacco: Never   Substance and Sexual Activity    Alcohol use: No    Drug use: No    Sexual activity: Not on file   Other Topics Concern    Not on file   Social History Narrative    Not on file     Social Drivers of Health     Financial Resource Strain: Not on file   Food Insecurity: Not on file   Transportation Needs: Not on file   Physical Activity: Not on file   Stress: Not on file   Social Connections: Not on file   Intimate Partner Violence: Not on file   Housing Stability: Not on file     No family history on file.    Review of Systems:     Skin: (-) rash,(-) psoriasis,(-) eczema, (-)skin cancer.   Musculoskeletal: (-) fractures,  (-) dislocations,(-) collagen vascular disease, (-) fibromyalgia, (-) multiple sclerosis, (-) muscular dystrophy, (-) RSD,(-) joint pain (-)swelling, (-) joint pain,swelling.  Neurologic: (-) epilepsy, (-)seizures,(-) brain tumor,(-) TIA, (-)stroke, (-)headaches, (-)Parkinson disease,(-) memory loss, (-) LOC.  Cardiovascular: (-) Chest pain, (-) swelling in legs/feet, (-) SOB, (-)